# Patient Record
Sex: FEMALE | Race: WHITE | NOT HISPANIC OR LATINO | Employment: OTHER | URBAN - METROPOLITAN AREA
[De-identification: names, ages, dates, MRNs, and addresses within clinical notes are randomized per-mention and may not be internally consistent; named-entity substitution may affect disease eponyms.]

---

## 2020-03-02 ENCOUNTER — CONSULT (OUTPATIENT)
Dept: ENDOCRINOLOGY | Facility: HOSPITAL | Age: 75
End: 2020-03-02
Payer: COMMERCIAL

## 2020-03-02 VITALS
BODY MASS INDEX: 20.81 KG/M2 | WEIGHT: 110.2 LBS | HEIGHT: 61 IN | HEART RATE: 78 BPM | DIASTOLIC BLOOD PRESSURE: 70 MMHG | SYSTOLIC BLOOD PRESSURE: 120 MMHG

## 2020-03-02 DIAGNOSIS — E06.3 HYPOTHYROIDISM DUE TO HASHIMOTO'S THYROIDITIS: Primary | ICD-10-CM

## 2020-03-02 DIAGNOSIS — E03.8 HYPOTHYROIDISM DUE TO HASHIMOTO'S THYROIDITIS: Primary | ICD-10-CM

## 2020-03-02 PROBLEM — E03.9 ACQUIRED HYPOTHYROIDISM: Status: ACTIVE | Noted: 2020-03-02

## 2020-03-02 PROCEDURE — 99204 OFFICE O/P NEW MOD 45 MIN: CPT | Performed by: INTERNAL MEDICINE

## 2020-03-02 RX ORDER — LISINOPRIL 20 MG/1
20 TABLET ORAL DAILY
COMMUNITY
Start: 2020-01-06

## 2020-03-02 RX ORDER — DORZOLAMIDE HYDROCHLORIDE AND TIMOLOL MALEATE 20; 5 MG/ML; MG/ML
1 SOLUTION/ DROPS OPHTHALMIC 2 TIMES DAILY
COMMUNITY
Start: 2020-01-27

## 2020-03-02 RX ORDER — ZOLEDRONIC ACID 5 MG/100ML
5 INJECTION, SOLUTION INTRAVENOUS
COMMUNITY

## 2020-03-02 RX ORDER — MULTIVITAMIN WITH IRON
TABLET ORAL
COMMUNITY

## 2020-03-02 RX ORDER — LEVOTHYROXINE SODIUM 88 MCG
TABLET ORAL
COMMUNITY
Start: 2020-02-04 | End: 2020-09-30 | Stop reason: ALTCHOICE

## 2020-03-02 NOTE — PATIENT INSTRUCTIONS
continue the synthroid for now   Let's check all thyroid levels and antibodies  Call within 1 week for results  Follow up to be determined  We could try adding T3 supplementation to see if you feel better, we usually have to decrease the synthroid some to do this as T3 supplementation is 4 times as potent at synthroid

## 2020-03-02 NOTE — PROGRESS NOTES
3/2/2020    Assessment/Plan      Diagnoses and all orders for this visit:    Hypothyroidism due to Hashimoto's thyroiditis  -     TSH, 3rd generation Lab Collect  -     T4, free Lab Collect  -     T3, free  -     Anti-microsomal antibody Lab Collect  -     Anti-thyroglobulin antibody    Other orders  -     SYNTHROID 88 MCG tablet; TAKE 1 TABLET BY MOUTH EVERY DAY ON EMPTY STOMACH IN THE MORNING  -     lisinopril (ZESTRIL) 10 mg tablet; Take 10 mg by mouth daily   -     dorzolamide-timolol (COSOPT) 22 3-6 8 MG/ML ophthalmic solution; Administer 1 drop to both eyes 2 (two) times a day   -     zoledronic acid (RECLAST) 5 mg/100 mL IV infusion (premix); Infuse 5 mg into a venous catheter Once a year  -     Calcium-Magnesium-Vitamin D (CITRACAL CALCIUM+D PO); Take by mouth 2 tablets twice a day  -     Magnesium 250 MG TABS; Take by mouth daily prn        Assessment/Plan:  Hypothyroidism due to Hashimoto's thyroiditis  For now, she will continue the same Synthroid 88 mcg daily  I will repeat her blood work now consisting of a TSH, free T4, free T3, thyroid microsomal antibodies, and antithyroglobulin antibodies  Given her symptoms, we need to rule out secondary hypothyroidism by doing both a TSH and free T4 and free T3 levels  She will call about a week afterwards for results and will make any changes to her thyroid hormone as needed  We could consider adding T3 supplementation but she would have to decrease her Synthroid is T3 supplementation is 4 times as potent is Synthroid  I did asked her to consider have rheumatologic evaluation for her fatigue given she already has 1 autoimmune disease, she could have another and normal thyroid function test rule out thyroid disease as the cause of her fatigue and feelings of being cold  Follow-up will be determined based on the blood work in any changes that are made        CC:  Hypothyroid consult    History of Present Illness     HPI: Luis Eduardo Baltazar is a 75 y o  year old female with history of hypothyroidism reportedly due to Hashimoto's thyroiditis here for evaluation  She is here to evaluate her endocrine system and thyroid issues due to blood work being all normal and yet she still feels horrible  She was diagnosed with hypothyroidism in the late 1990s  She was losing her hair and got evaluated with Thyroid blood work that was abnormal  She was hypothyroid  Her daughter has been on thyroid meds since a young age  She was started on thyroid hormone and was told she had hashimoto's  Her daughter was on synthroid and [place] thyroid in [place]  Daughter is currently on 175 mcg daily  Sid Mackey is currently on 88 mcg daily and has not changed dose for many years  She struggles with taking it properly  She was seeing Dr Donnelly Nurse and was on synthroid brand  She was then switched to generic by PCP and started to lose hair  She switched back to brand synthroid and it took almost a year for this to improve  She is always cold  It takes 1 1/2 hours after waking up to feel better  She is overwhelmingly cold  She participate in 30 min of aerobics to start to warm up  She has to wear 2 layers even with exercise  Hands never get warm  Has significant fatigue and lethergy  She has occasional sad feelings  She has dry skin and brittle nails but no hair loss  She has chronic constipation and can go 10 days without a bowel movement  She denies heat intolerance, tremors, palpitation, diarrhea, anxiety, or insomnia  Weight has been stable  She denies diplopia  She has no compressive thyroid symptoms or difficulties with swallowing  She has no history of head or neck irradiation in the past     She was on prolia and developed a rash with abnormal T cells on skin/rash biopsy  It is gone off prolia  She had 3 prolia injections and then switched to reclast even though dexa scan got better  She is now seeing rheumatology for osteoporosis      Review of Systems   Constitutional: Positive for fatigue  Negative for unexpected weight change  Nutritionist told her she is eating enough and that is why she is tired but can't eat anymore  Weight stable  HENT: Negative for hearing loss, tinnitus and trouble swallowing  No XRT to the head or neck in the past    Eyes: Negative for visual disturbance  No diplopia  uses some glasses  Respiratory: Negative for chest tightness and shortness of breath  Cardiovascular: Negative for chest pain, palpitations and leg swelling  Gastrointestinal: Positive for constipation  Negative for abdominal pain, diarrhea and nausea  Has chronic constipation  Can go 10 days without a BM  Endocrine: Positive for cold intolerance  Negative for heat intolerance  Musculoskeletal: Negative for arthralgias and back pain  Skin: Negative for rash  No dry skin  Has brittle nails  A lot of nail peeling  No hair loss  Neurological: Positive for headaches  Negative for dizziness, tremors, weakness, light-headedness and numbness  Will get some low grade headaches frequently  Psychiatric/Behavioral: Negative for dysphoric mood and sleep disturbance  The patient is not nervous/anxious  Occasionally sad  Historical Information   Past Medical History:   Diagnosis Date    Abnormal Pap smear of cervix     Duane syndrome of left eye     Eye muscle paralysis, left     since birth, 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      Glaucoma     Hypertension     Osteoporosis     Raynaud phenomenon      Past Surgical History:   Procedure Laterality Date    CATARACT EXTRACTION, BILATERAL Bilateral      SECTION      X 2    EYE SURGERY  2010    macula surgery     Social History   Social History     Substance and Sexual Activity   Alcohol Use Yes    Frequency: 4 or more times a week    Comment: 1-2 glases wine daily     Social History     Substance and Sexual Activity   Drug Use Never     Social History     Tobacco Use Smoking Status Former Smoker    Types: Cigarettes    Last attempt to quit:     Years since quittin 2   Smokeless Tobacco Never Used     Family History:   Family History   Problem Relation Age of Onset    Asthma Mother     No Known Problems Father     Hashimoto's thyroiditis Daughter     Hypothyroidism Daughter     Raynaud syndrome Daughter     Brain cancer Brother     No Known Problems Son     No Known Problems Son        Meds/Allergies   Current Outpatient Medications   Medication Sig Dispense Refill    Calcium-Magnesium-Vitamin D (CITRACAL CALCIUM+D PO) Take by mouth 2 tablets twice a day   dorzolamide-timolol (COSOPT) 22 3-6 8 MG/ML ophthalmic solution Administer 1 drop to both eyes 2 (two) times a day       lisinopril (ZESTRIL) 10 mg tablet Take 10 mg by mouth daily       Magnesium 250 MG TABS Take by mouth daily prn      SYNTHROID 88 MCG tablet TAKE 1 TABLET BY MOUTH EVERY DAY ON EMPTY STOMACH IN THE MORNING      zoledronic acid (RECLAST) 5 mg/100 mL IV infusion (premix) Infuse 5 mg into a venous catheter Once a year       No current facility-administered medications for this visit  Allergies   Allergen Reactions    Clindamycin      Other reaction(s): itching    Prolia [Denosumab]      rash       Objective   Vitals: Blood pressure 120/70, pulse 78, height 5' 1" (1 549 m), weight 50 kg (110 lb 3 2 oz)  Invasive Devices     None                 Physical Exam   Constitutional: She is oriented to person, place, and time  She appears well-developed and well-nourished  HENT:   Head: Normocephalic and atraumatic  Eyes: Pupils are equal, round, and reactive to light  Conjunctivae are normal    Left eye unable to look to the left  No lid lag, stare, proptosis, or periorbital edema  Neck: Normal range of motion  Neck supple  No thyromegaly present  Thyroid normal in size without palpable thyroid nodules  No bruits over the thyroid gland or carotids     Cardiovascular: Normal rate, regular rhythm and normal heart sounds  No murmur heard  Pulmonary/Chest: Effort normal and breath sounds normal  She has no wheezes  Abdominal: Soft  Bowel sounds are normal  There is no tenderness  Musculoskeletal: She exhibits no edema or deformity  No tremor of the outstretched hands  No spinous process tenderness  No CVA tenderness  Lymphadenopathy:     She has no cervical adenopathy  Neurological: She is alert and oriented to person, place, and time  She has normal reflexes  Deep tendon reflexes normal    Skin: Skin is warm and dry  No rash noted  Vitals reviewed  The history was obtained from the review of the chart and from the patient  Lab Results:    Blood work done on 11/7/19 showed a CMP with a glucose of 87 but was otherwise normal   TSH is 0 74  CBC is normal with no anemia  No future appointments

## 2020-03-05 LAB
T3FREE SERPL-MCNC: 2.7 PG/ML (ref 2.3–4.2)
T4 FREE SERPL-MCNC: 1.7 NG/DL (ref 0.8–1.8)
THYROGLOB AB SERPL-ACNC: <1 IU/ML
THYROPEROXIDASE AB SERPL-ACNC: 4 IU/ML
TSH SERPL-ACNC: 0.76 MIU/L (ref 0.4–4.5)

## 2020-03-12 DIAGNOSIS — E03.8 HYPOTHYROIDISM DUE TO HASHIMOTO'S THYROIDITIS: Primary | ICD-10-CM

## 2020-03-12 DIAGNOSIS — E06.3 HYPOTHYROIDISM DUE TO HASHIMOTO'S THYROIDITIS: Primary | ICD-10-CM

## 2020-03-12 RX ORDER — LIOTHYRONINE SODIUM 5 UG/1
5 TABLET ORAL DAILY
Qty: 30 TABLET | Refills: 3 | Status: SHIPPED | OUTPATIENT
Start: 2020-03-12 | End: 2020-06-25 | Stop reason: SDUPTHER

## 2020-03-12 RX ORDER — LEVOTHYROXINE SODIUM 50 MCG
50 TABLET ORAL DAILY
Qty: 30 TABLET | Refills: 3 | Status: SHIPPED | OUTPATIENT
Start: 2020-03-12 | End: 2020-09-30 | Stop reason: ALTCHOICE

## 2020-03-13 ENCOUNTER — TELEPHONE (OUTPATIENT)
Dept: OTHER | Facility: OTHER | Age: 75
End: 2020-03-13

## 2020-03-16 NOTE — TELEPHONE ENCOUNTER
Received call from patient  She would like to know if she can take the Cytomel and Synthroid together at the same time?

## 2020-05-21 LAB
T3FREE SERPL-MCNC: 3.2 PG/ML (ref 2.3–4.2)
T4 FREE SERPL-MCNC: 1.1 NG/DL (ref 0.8–1.8)
TSH SERPL-ACNC: 2.3 MIU/L (ref 0.4–4.5)

## 2020-06-09 ENCOUNTER — TELEPHONE (OUTPATIENT)
Dept: ENDOCRINOLOGY | Facility: HOSPITAL | Age: 75
End: 2020-06-09

## 2020-06-09 DIAGNOSIS — E06.3 HYPOTHYROIDISM DUE TO HASHIMOTO'S THYROIDITIS: Primary | ICD-10-CM

## 2020-06-09 DIAGNOSIS — E03.8 HYPOTHYROIDISM DUE TO HASHIMOTO'S THYROIDITIS: Primary | ICD-10-CM

## 2020-06-09 RX ORDER — LEVOTHYROXINE SODIUM 75 MCG
75 TABLET ORAL DAILY
Qty: 90 TABLET | Refills: 1 | Status: SHIPPED | OUTPATIENT
Start: 2020-06-09 | End: 2020-09-30 | Stop reason: DRUGHIGH

## 2020-06-25 ENCOUNTER — TELEPHONE (OUTPATIENT)
Dept: ENDOCRINOLOGY | Facility: HOSPITAL | Age: 75
End: 2020-06-25

## 2020-06-25 DIAGNOSIS — E03.8 HYPOTHYROIDISM DUE TO HASHIMOTO'S THYROIDITIS: ICD-10-CM

## 2020-06-25 DIAGNOSIS — E06.3 HYPOTHYROIDISM DUE TO HASHIMOTO'S THYROIDITIS: ICD-10-CM

## 2020-06-25 RX ORDER — LIOTHYRONINE SODIUM 5 UG/1
5 TABLET ORAL DAILY
Qty: 90 TABLET | Refills: 3 | Status: SHIPPED | OUTPATIENT
Start: 2020-06-25 | End: 2020-09-30 | Stop reason: ALTCHOICE

## 2020-09-12 LAB
T3FREE SERPL-MCNC: 3.3 PG/ML (ref 2.3–4.2)
T4 FREE SERPL-MCNC: 1.5 NG/DL (ref 0.8–1.8)
TSH SERPL-ACNC: 0.21 MIU/L (ref 0.4–4.5)

## 2020-09-30 ENCOUNTER — TELEMEDICINE (OUTPATIENT)
Dept: ENDOCRINOLOGY | Facility: HOSPITAL | Age: 75
End: 2020-09-30
Payer: COMMERCIAL

## 2020-09-30 DIAGNOSIS — E03.8 HYPOTHYROIDISM DUE TO HASHIMOTO'S THYROIDITIS: Primary | ICD-10-CM

## 2020-09-30 DIAGNOSIS — E06.3 HYPOTHYROIDISM DUE TO HASHIMOTO'S THYROIDITIS: Primary | ICD-10-CM

## 2020-09-30 PROCEDURE — 99213 OFFICE O/P EST LOW 20 MIN: CPT | Performed by: INTERNAL MEDICINE

## 2020-09-30 RX ORDER — LEVOTHYROXINE SODIUM 88 MCG
88 TABLET ORAL DAILY
Start: 2020-09-30 | End: 2021-02-15 | Stop reason: SDUPTHER

## 2020-09-30 NOTE — PATIENT INSTRUCTIONS
The blood work is on the overactive side so I think that your feelings of being cold and fatigued are not related to the thyroid  Let's get you back to synthroid 88 mcg daily  Take liothyronine 5 mcg daily with synthroid 75 mcg daily until out of liothyronine  When out of the liothyronine, take synthroid 75 mcg 1 tablet 6 days a week and 2 tablets 1 day a week until used up and then change to synthroid 88 mcg 1 tablet every day  Follow up in 6 months with blood work

## 2020-09-30 NOTE — PROGRESS NOTES
Virtual Regular Visit      Assessment/Plan:    Problem List Items Addressed This Visit        Endocrine    Hypothyroidism due to Hashimoto's thyroiditis - Primary    Relevant Medications    Synthroid 88 MCG tablet    Other Relevant Orders    TSH, 3rd generation Lab Collect    T4, free Lab Collect    T3, free        Assessment and plan:  Hypothyroidism due to Hashimoto's thyroiditis  Her most recent thyroid function tests are a bit on the hyperthyroid side given a slightly low TSH  She is still having hypothyroid symptoms of being cold and fatigued so I do not think these are related to her thyroid  We have tried various different types of thyroid hormone replacement and this has not improved her symptoms  At this point, she wants to go back to just Synthroid and I will switch her to Synthroid 88 mcg daily  I did instruct her had a use up her Synthroid 75 mcg and liothyronine 5 mcg before switching to the Synthroid 88 mcg  I have asked her to follow up in 6 months with preceding TSH, free T4, and free T3  If all is well then, then I would have her follow up once a year  Reason for visit is   Chief Complaint   Patient presents with    Virtual Regular Visit    and hypothyroid follow-up    Encounter provider Jj Hannon MD    Provider located at [address] [place] [address] [address] [place] [id number]      Recent Visits  No visits were found meeting these conditions  Showing recent visits within past 7 days and meeting all other requirements     Today's Visits  Date Type Provider Dept   09/30/20 Telemedicine Jj Hannon MD [facility]   Showing today's visits and meeting all other requirements     Future Appointments  No visits were found meeting these conditions     Showing future appointments within next 150 days and meeting all other requirements        The patient was identified by name and date of birth  Junior Sofia was informed that this is a telemedicine visit and that the visit is being conducted through [address] and patient was informed that this is not a secure, HIPAA-complaint platform  She agrees to proceed     My office door was closed  No one else was in the room  She acknowledged consent and understanding of privacy and security of the video platform  The patient has agreed to participate and understands they can discontinue the visit at any time  Patient is aware this is a billable service  Elaine aSbillon is a 75 y o  female with a history of hypothyroidism due to Hashimoto's thyroiditis for follow-up visit  She was diagnosed with hypothyroidism in the late 1990s when he she was losing hair  She knows that her daughter has been on thyroid medicine since a young age  She was on Synthroid 88 mcg daily in March 2020 but was continuing to have fatigue and feeling cold  We tried switching her thyroid hormone to include T3 supplementation by decreasing her Synthroid an adding liothyronine  Then Synthroid dosage was adjusted up  She has still not had resolution of the symptoms  She is currently taking Synthroid 75 mcg 1 tablet daily and liothyronine 5 mcg 1 tablet daily  She is always cold  She is always fatigued  She denies heat intolerance, weight changes, palpitation, or tremors  She has a sleeping abnormality about once a week for which she will only had 2 hours of sleep and is thus very fatigued the next day  She has chronic constipation  She denies diarrhea, anxiety or depression  She has chronic brittle nails that are a bit better recently but denies hair loss or dry skin  She denies diplopia  She denies compressive thyroid symptoms or difficulties with swallowing        HPI     Past Medical History:   Diagnosis Date    Abnormal Pap smear of cervix     Duane syndrome of left eye     Eye muscle paralysis, left     since birth, 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   Glaucoma     Hypertension     Osteoporosis     Raynaud phenomenon        Past Surgical History:   Procedure Laterality Date    CATARACT EXTRACTION, BILATERAL Bilateral 2011     SECTION      X 2    EYE SURGERY  2010    macula surgery       Current Outpatient Medications   Medication Sig Dispense Refill    Calcium-Magnesium-Vitamin D (CITRACAL CALCIUM+D PO) Take by mouth 2 tablets twice a day   dorzolamide-timolol (COSOPT) 22 3-6 8 MG/ML ophthalmic solution Administer 1 drop to both eyes 2 (two) times a day       lisinopril (ZESTRIL) 20 mg tablet Take 20 mg by mouth daily       Magnesium 250 MG TABS Take by mouth daily prn      zoledronic acid (RECLAST) 5 mg/100 mL IV infusion (premix) Infuse 5 mg into a venous catheter Once a year      Synthroid 88 MCG tablet Take 1 tablet (88 mcg total) by mouth daily       No current facility-administered medications for this visit  Allergies   Allergen Reactions    Clindamycin      Other reaction(s): itching    Prolia [Denosumab]      rash       Review of Systems   Constitutional: Positive for fatigue  Negative for unexpected weight change  Always fatigued and now worse with COVID situation  HENT: Negative for trouble swallowing  Eyes: Negative for visual disturbance  No diplopia  Respiratory: Negative for chest tightness and shortness of breath  Gets winded a bit more in general    Cardiovascular: Negative for chest pain and palpitations  Seeing a cardiologist  BP is very variable  Gastrointestinal: Positive for constipation  Negative for abdominal pain, diarrhea and nausea  Unchanged chronic constipation  Using magnesium oxide daily  Goes every 2-3 days  Endocrine: Positive for cold intolerance  Negative for heat intolerance  Always cold, even when it was very hot out  She will wear 2 layers even in 78 degrees   Finally feels warm after 30 min of cardio workout  Skin: Negative for rash  No dry skin  No hair loss  Has chronic brittle nails, a bit better recently  Neurological: Positive for light-headedness and numbness  Negative for dizziness, tremors and headaches  Can't put head down or below knees as gets very lightheaded  Has increased water intake to make sure she is not dehydrated  has lost all feeling in right ring finger after injury  Psychiatric/Behavioral: Positive for sleep disturbance  Negative for dysphoric mood  The patient is not nervous/anxious  Will have at least once a week of sleeping only 2 hours  Video Exam    There were no vitals filed for this visit  Physical Exam     Physical Exam   Constitutional:  She is oriented to person, place, and time  She appears well-developed and well-nourished  No distress  HENT:  No lid lag, stare, proptosis, or periorbital edema  Head: Normocephalic and atraumatic  Neck: Normal range of motion  No obvious thyroid enlargement  Pulmonary/Chest: Effort normal   No audible wheezing  Musculoskeletal: Normal range of motion  Neurological:  She is alert and oriented to person, place, and time  Skin:  She is not diaphoretic  Psychiatric:  She has a normal mood and affect  Her behavior is normal      Blood work:  Blood work done on 9//2020 showed a TSH of 0 21 with a free T4 of 1 5 and a free T3 of 3 3  I spent 25 minutes directly with the patient during this visit      VIRTUAL VISIT DISCLAIMER    Junior Bismark acknowledges that she has consented to an online visit or consultation  She understands that the online visit is based solely on information provided by her, and that, in the absence of a face-to-face physical evaluation by the physician, the diagnosis she receives is both limited and provisional in terms of accuracy and completeness  This is not intended to replace a full medical face-to-face evaluation by the physician   Junior Sofia understands and accepts these terms

## 2021-01-20 DIAGNOSIS — Z23 ENCOUNTER FOR IMMUNIZATION: ICD-10-CM

## 2021-02-12 ENCOUNTER — IMMUNIZATIONS (OUTPATIENT)
Dept: FAMILY MEDICINE CLINIC | Facility: HOSPITAL | Age: 76
End: 2021-02-12

## 2021-02-12 DIAGNOSIS — Z23 ENCOUNTER FOR IMMUNIZATION: Primary | ICD-10-CM

## 2021-02-12 PROCEDURE — 0001A SARS-COV-2 / COVID-19 MRNA VACCINE (PFIZER-BIONTECH) 30 MCG: CPT

## 2021-02-12 PROCEDURE — 91300 SARS-COV-2 / COVID-19 MRNA VACCINE (PFIZER-BIONTECH) 30 MCG: CPT

## 2021-02-15 DIAGNOSIS — E03.8 HYPOTHYROIDISM DUE TO HASHIMOTO'S THYROIDITIS: ICD-10-CM

## 2021-02-15 DIAGNOSIS — E06.3 HYPOTHYROIDISM DUE TO HASHIMOTO'S THYROIDITIS: ICD-10-CM

## 2021-02-15 RX ORDER — LEVOTHYROXINE SODIUM 88 MCG
88 TABLET ORAL DAILY
Refills: 0
Start: 2021-02-15 | End: 2021-02-18 | Stop reason: SDUPTHER

## 2021-02-18 DIAGNOSIS — E06.3 HYPOTHYROIDISM DUE TO HASHIMOTO'S THYROIDITIS: ICD-10-CM

## 2021-02-18 DIAGNOSIS — E03.8 HYPOTHYROIDISM DUE TO HASHIMOTO'S THYROIDITIS: ICD-10-CM

## 2021-02-18 RX ORDER — LEVOTHYROXINE SODIUM 88 MCG
88 TABLET ORAL DAILY
Qty: 90 TABLET | Refills: 0 | Status: SHIPPED | OUTPATIENT
Start: 2021-02-18 | End: 2021-05-10 | Stop reason: SDUPTHER

## 2021-03-03 ENCOUNTER — IMMUNIZATIONS (OUTPATIENT)
Dept: FAMILY MEDICINE CLINIC | Facility: HOSPITAL | Age: 76
End: 2021-03-03

## 2021-03-03 DIAGNOSIS — Z23 ENCOUNTER FOR IMMUNIZATION: Primary | ICD-10-CM

## 2021-03-03 PROCEDURE — 91300 SARS-COV-2 / COVID-19 MRNA VACCINE (PFIZER-BIONTECH) 30 MCG: CPT

## 2021-03-03 PROCEDURE — 0002A SARS-COV-2 / COVID-19 MRNA VACCINE (PFIZER-BIONTECH) 30 MCG: CPT

## 2021-03-13 LAB
T4 FREE SERPL-MCNC: 1.4 NG/DL (ref 0.8–1.8)
TSH SERPL-ACNC: 0.93 MIU/L (ref 0.4–4.5)

## 2021-03-18 ENCOUNTER — TELEPHONE (OUTPATIENT)
Dept: ENDOCRINOLOGY | Facility: HOSPITAL | Age: 76
End: 2021-03-18

## 2021-03-18 NOTE — TELEPHONE ENCOUNTER
She needs to be seen at least 1-2 times yearly to order the synthroid or she can follow up with her PCP and they can order it

## 2021-03-18 NOTE — TELEPHONE ENCOUNTER
Patient left voicemail "questioning necessity of upcoming appointment " she stated she wants a reason that she should keep appointment because she does not believe her medication dosage is a reason to come in  Please advise

## 2021-03-18 NOTE — TELEPHONE ENCOUNTER
Patient assumed her labs were fine and you will just order the same dose  She was agreeable to a virtual appointment so you can speak to her about labs and any possible symptoms

## 2021-03-31 ENCOUNTER — TELEMEDICINE (OUTPATIENT)
Dept: ENDOCRINOLOGY | Facility: HOSPITAL | Age: 76
End: 2021-03-31
Payer: COMMERCIAL

## 2021-03-31 DIAGNOSIS — E03.8 HYPOTHYROIDISM DUE TO HASHIMOTO'S THYROIDITIS: Primary | ICD-10-CM

## 2021-03-31 DIAGNOSIS — E06.3 HYPOTHYROIDISM DUE TO HASHIMOTO'S THYROIDITIS: Primary | ICD-10-CM

## 2021-03-31 PROCEDURE — 1160F RVW MEDS BY RX/DR IN RCRD: CPT | Performed by: INTERNAL MEDICINE

## 2021-03-31 PROCEDURE — 99213 OFFICE O/P EST LOW 20 MIN: CPT | Performed by: INTERNAL MEDICINE

## 2021-03-31 PROCEDURE — 1036F TOBACCO NON-USER: CPT | Performed by: INTERNAL MEDICINE

## 2021-03-31 NOTE — PATIENT INSTRUCTIONS
The thyroid blood work is normal   Continue the same brand name synthroid 88 mcg daily  Follow up in 1 year with blood work

## 2021-03-31 NOTE — PROGRESS NOTES
Virtual Regular Visit      Assessment/Plan:    Problem List Items Addressed This Visit        Endocrine    Hypothyroidism due to Hashimoto's thyroiditis - Primary    Relevant Orders    TSH, 3rd generation Lab Collect    T4, free Lab Collect          Assessment and plan:      Hypothyroidism due to Hashimoto's thyroiditis  Most recent thyroid function tests are normal   At this point she is biochemically euthyroid  She still has a lot of symptoms of hypothyroidism so they are not related to her thyroid disease given her thyroid function tests are normal   I have reassured her regarding this  We did try both T3 and T4 supplementation in the past and she felt no different utilizing T3 supplementation versus just T4 supplementation  At this point, she will continue the same Synthroid brand 88 mcg daily  I have asked her to follow up in 1 year with preceding TSH and free T4 along with free T3  Reason for visit is   Chief Complaint   Patient presents with    Virtual Regular Visit      And hypothyroid follow-up    Encounter provider Jarvis Cash MD    Provider located at [address] [place] [address] [address] [place] [id number]      Recent Visits  No visits were found meeting these conditions  Showing recent visits within past 7 days and meeting all other requirements     Today's Visits  Date Type Provider Dept   03/31/21 Telemedicine Jarvis Cash MD [facility]   Showing today's visits and meeting all other requirements     Future Appointments  No visits were found meeting these conditions  Showing future appointments within next 150 days and meeting all other requirements        The patient was identified by name and date of birth   Corina Amanda was informed that this is a telemedicine visit and that the visit is being conducted through [address] [place] [address] and patient was informed that this is not a secure, HIPAA-compliant platform  She agrees to proceed     My office door was closed  No one else was in the room  She acknowledged consent and understanding of privacy and security of the video platform  The patient has agreed to participate and understands they can discontinue the visit at any time  Patient is aware this is a billable service  Humberto Hair is a 67 y o  female  With a history of hypothyroidism due to Hashimoto's thyroiditis for follow-up visit via video telemedicine   She was diagnosed with hypothyroidism in the late 1990s when she was losing hair  She was started on thyroid hormone and is not changed dosages over the years  We tried adjusting her thyroid hormone to add T3 supplementation but that decreased her TSH in the fall 2020 and she felt no different with T3 supplementation so she was switch back to just brand-name Synthroid  She is currently taking brand-name Synthroid 88 mcg daily  She continues to be always somewhat cold and just can not seem to get warm  She can be very hot and sweaty with activity though  Her fingers always feel cold  She is always fatigued and exhausted  She has constipation with come uncomfortable miss in her abdomen for which she is starting to use magnesium  She has some sleep alterations as her abdomen has a lot of gas and run billing  She denies palpitation or tremors  Weight is stable  She has some anxiety  She denies diarrhea or depression  She has some hair loss and chronic unchanged brittle nails but no dry skin  She denies diplopia  She denies compressive thyroid symptoms or difficulties with swallowing  Of note, she has gotten her COVID vaccinations and she is hoping to drive to visit her grand children in the next month to she has not seen any year        HPI     Past Medical History:   Diagnosis Date    Abnormal Pap smear of cervix     Duane syndrome of left eye     Eye muscle paralysis, left     since birth, 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   Glaucoma     Hypertension     Osteoporosis     Raynaud phenomenon        Past Surgical History:   Procedure Laterality Date    CATARACT EXTRACTION, BILATERAL Bilateral 2011     SECTION      X 2    EYE SURGERY  2010    macula surgery       Current Outpatient Medications   Medication Sig Dispense Refill    Calcium-Magnesium-Vitamin D (CITRACAL CALCIUM+D PO) Take by mouth 2 tablets twice a day   dorzolamide-timolol (COSOPT) 22 3-6 8 MG/ML ophthalmic solution Administer 1 drop to both eyes 2 (two) times a day       Magnesium 250 MG TABS Take by mouth daily prn      Synthroid 88 MCG tablet Take 1 tablet (88 mcg total) by mouth daily 90 tablet 0    zoledronic acid (RECLAST) 5 mg/100 mL IV infusion (premix) Infuse 5 mg into a venous catheter Once a year      lisinopril (ZESTRIL) 20 mg tablet Take 20 mg by mouth daily        No current facility-administered medications for this visit  Allergies   Allergen Reactions    Clindamycin      Other reaction(s): itching    Prolia [Denosumab]      rash       Review of Systems   Constitutional: Positive for fatigue  Negative for unexpected weight change  Always feels fatigued and exhausted  HENT: Negative for trouble swallowing  Eyes: Negative for visual disturbance  No diplopia  Respiratory: Negative for chest tightness and shortness of breath  Cardiovascular: Negative for chest pain and palpitations  Gastrointestinal: Positive for constipation  Negative for abdominal pain, diarrhea and nausea  She has been constipated an abdomen is uncomfortable so she has started using magnesium  Endocrine: Positive for cold intolerance and polyuria  Negative for heat intolerance  Always very cold and can not seem to get warm  Fingers very cold  She can be hot and sweaty with significant activity  Skin: Negative for rash  No dry skin   Has chronic unchanged brittle nails  Has hair loss  Neurological: Negative for dizziness, tremors, light-headedness and headaches  Psychiatric/Behavioral: Positive for sleep disturbance  Negative for dysphoric mood  The patient is nervous/anxious  She has some anxiety  Sleep is altered in interrupted by having a lot of abdominal gas and abdominal run lying sounds  Video Exam    There were no vitals filed for this visit  Physical Exam     Physical Exam   Constitutional:   She is oriented to person, place, and time  she appears well-developed and well-nourished  No distress  HENT:  No lid lag, stare, proptosis, or periorbital edema  Head: Normocephalic and atraumatic  Neck: Normal range of motion  No thyroid enlargement evident  Pulmonary/Chest: Effort normal    No audible wheezing  Musculoskeletal: Normal range of motion  Neurological:   She is alert and oriented to person, place, and time  Skin:  she is not diaphoretic  Psychiatric:  she has a normal mood and affect  her behavior is normal       Blood work:    Blood work done on 03/12/2021 showed a TSH of 0 93 with a free T4 of 1 4  I spent 23 minutes directly with the patient during this visit      VIRTUAL VISIT DISCLAIMER    Enid Stewart acknowledges that she has consented to an online visit or consultation  She understands that the online visit is based solely on information provided by her, and that, in the absence of a face-to-face physical evaluation by the physician, the diagnosis she receives is both limited and provisional in terms of accuracy and completeness  This is not intended to replace a full medical face-to-face evaluation by the physician  Enid Stewart understands and accepts these terms

## 2021-05-10 DIAGNOSIS — E06.3 HYPOTHYROIDISM DUE TO HASHIMOTO'S THYROIDITIS: ICD-10-CM

## 2021-05-10 DIAGNOSIS — E03.8 HYPOTHYROIDISM DUE TO HASHIMOTO'S THYROIDITIS: ICD-10-CM

## 2021-05-10 RX ORDER — LEVOTHYROXINE SODIUM 88 MCG
88 TABLET ORAL DAILY
Qty: 90 TABLET | Refills: 3 | Status: SHIPPED | OUTPATIENT
Start: 2021-05-10 | End: 2022-02-09 | Stop reason: SDUPTHER

## 2022-02-09 DIAGNOSIS — E03.8 HYPOTHYROIDISM DUE TO HASHIMOTO'S THYROIDITIS: ICD-10-CM

## 2022-02-09 DIAGNOSIS — E06.3 HYPOTHYROIDISM DUE TO HASHIMOTO'S THYROIDITIS: ICD-10-CM

## 2022-02-09 RX ORDER — LEVOTHYROXINE SODIUM 88 MCG
88 TABLET ORAL DAILY
Qty: 90 TABLET | Refills: 3 | Status: SHIPPED | OUTPATIENT
Start: 2022-02-09

## 2022-03-22 DIAGNOSIS — E03.8 HYPOTHYROIDISM DUE TO HASHIMOTO'S THYROIDITIS: Primary | ICD-10-CM

## 2022-03-22 DIAGNOSIS — E06.3 HYPOTHYROIDISM DUE TO HASHIMOTO'S THYROIDITIS: Primary | ICD-10-CM

## 2022-03-31 LAB
T4 FREE SERPL-MCNC: 1.7 NG/DL (ref 0.8–1.8)
TSH SERPL-ACNC: 0.67 MIU/L (ref 0.4–4.5)